# Patient Record
Sex: FEMALE | Race: WHITE | NOT HISPANIC OR LATINO | Employment: STUDENT | URBAN - NONMETROPOLITAN AREA
[De-identification: names, ages, dates, MRNs, and addresses within clinical notes are randomized per-mention and may not be internally consistent; named-entity substitution may affect disease eponyms.]

---
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
## 2019-02-04 NOTE — ED ADULT NURSE NOTE - CARDIO WDL
CONST: No fever, chills or bodyaches  EYES: No pain, redness, drainage or visual changes.  ENT: No ear pain or discharge, nasal discharge or congestion. No sore throat  CARD: No chest pain, palpitations  RESP: No SOB, cough, hemoptysis. No hx of asthma or COPD  GI: No abdominal pain, N/V/D  SKIN: No rashes  NEURO: No headache, dizziness, paresthesias or LOC
Normal rate, regular rhythm, normal S1, S2 heart sounds heard.

## 2019-12-31 NOTE — ED POST DISCHARGE NOTE - RESULT SUMMARY
Rn spoke with her and she is aware normal pap but +HPV however HPV is not high risk type.  She is aware she needs pap in one year.  Copy of her pap and patient education on HPV mailed to her today.   received call from Leonarda, medical coordinator at the [facility], requesting copy of results from pt's visit. faxed them to her with HIPAA precautions

## 2020-05-19 ENCOUNTER — OFFICE VISIT (OUTPATIENT)
Dept: OBSTETRICS AND GYNECOLOGY | Facility: CLINIC | Age: 20
End: 2020-05-19

## 2020-05-19 VITALS
WEIGHT: 187.8 LBS | BODY MASS INDEX: 29.47 KG/M2 | SYSTOLIC BLOOD PRESSURE: 118 MMHG | HEIGHT: 67 IN | DIASTOLIC BLOOD PRESSURE: 80 MMHG

## 2020-05-19 DIAGNOSIS — Z30.431 IUD CHECK UP: ICD-10-CM

## 2020-05-19 DIAGNOSIS — Z00.00 ANNUAL PHYSICAL EXAM: Primary | ICD-10-CM

## 2020-05-19 DIAGNOSIS — T83.32XA MALPOSITIONED IUD, INITIAL ENCOUNTER: ICD-10-CM

## 2020-05-19 PROCEDURE — 99395 PREV VISIT EST AGE 18-39: CPT | Performed by: PHYSICIAN ASSISTANT

## 2020-05-19 PROCEDURE — 58301 REMOVE INTRAUTERINE DEVICE: CPT | Performed by: PHYSICIAN ASSISTANT

## 2020-05-19 NOTE — PROGRESS NOTES
IUD Removal    Date of procedure:  5/19/2020    Risks and benefits discussed? yes  All questions answered? yes  Consents given by The patient  Written consent obtained? yes  Reason for removal: malpositioned      Procedure documentation:    A speculum was placed in order to view the cervix.  A tenaculum did not need to be placed on the anterior cervical lip.  Cervical dilation did not need to be performed in order to access the string.  The IUD string was easily seen.  The string was grasped and the IUD was removed without difficulty.  The IUD did not appear to be adherent to the uterine cavity. It was removed intact.    She tolerated the procedure without any difficulty.     Post procedure instructions: Patient notified to call with heavy bleeding, fever or increasing pain.    Follow up  prn    This note was electronically signed.    Jasmyne Ricardo PA-C  May 19, 2020

## 2020-05-19 NOTE — PATIENT INSTRUCTIONS
Patient is counseled regarding the malpositioned IUD and not protecting her for pregnancy.  After consent is obtained the IUD was removed in the office today without difficulty.  She would like to have a new IUD inserted and she may call with her next menses to schedule insertion of the new IUD.  She is instructed to abstain or use condoms in the meantime.  She is encouraged to use condoms always for prevention of STIs.  Encourage regular exercise.

## 2020-05-19 NOTE — PROGRESS NOTES
Subjective   Chief Complaint   Patient presents with   • Annual Exam     Patient would like to have IUD checked, No complaints       Angela Leung is a 19 y.o. year old  presenting to be seen for her annual exam.  She has Kyleena IUD placed 2017. She has had regular monthly light bleeds but 4 weeks ago had heavy bleed for 1 1/2 weeks. Had normal period -  No pelvic pain or cramping  She is sexually active and monogamous. Declines STI screening today.   After pelvic exam is done today and it is noted that IUD strings appear rather long transvaginal ultrasound is done.  It revealed that the IUD was down in the lower uterine segment and the cervix and not in the endometrium.IUD Removal      History reviewed. No pertinent past medical history.     Current Outpatient Medications:   •  levonorgestrel (Kyleena) 19.5 MG intrauterine device IUD, Take to office as directed, Disp: 1 each, Rfl: 0   No Known Allergies   Past Surgical History:   Procedure Laterality Date   • AVULSION TOENAIL PLATE        Social History     Socioeconomic History   • Marital status: Single     Spouse name: Not on file   • Number of children: Not on file   • Years of education: Not on file   • Highest education level: Not on file   Tobacco Use   • Smoking status: Never Smoker   • Smokeless tobacco: Never Used   Substance and Sexual Activity   • Alcohol use: No   • Drug use: No   • Sexual activity: Yes     Partners: Male     Birth control/protection: IUD      Family History   Problem Relation Age of Onset   • No Known Problems Father    • No Known Problems Mother    • No Known Problems Paternal Grandfather    • No Known Problems Paternal Grandmother    • No Known Problems Maternal Grandmother    • No Known Problems Maternal Grandfather        Review of Systems   Constitutional: Negative for chills, fatigue and fever.   Gastrointestinal: Negative for abdominal pain, constipation, diarrhea, nausea and vomiting.   Genitourinary:  "Positive for vaginal bleeding. Negative for difficulty urinating, dysuria, menstrual problem, pelvic pain and vaginal discharge.   Musculoskeletal: Negative.    All other systems reviewed and are negative.          Objective   /80   Ht 170.2 cm (67\")   Wt 85.2 kg (187 lb 12.8 oz)   Breastfeeding No   BMI 29.41 kg/m²     Physical Exam   Constitutional: She appears well-developed and well-nourished. She is cooperative. No distress.   Eyes: Conjunctivae, EOM and lids are normal.   Neck: No thyroid mass and no thyromegaly present.   Cardiovascular: Normal rate, regular rhythm and normal heart sounds.   Pulmonary/Chest: Effort normal and breath sounds normal.   Abdominal: Soft. Normal appearance. There is no tenderness. There is no rigidity and no guarding.   Genitourinary: Uterus normal. There is no tenderness or lesion on the right labia. There is no tenderness or lesion on the left labia. Cervix exhibits no motion tenderness, no discharge and no friability. Right adnexum displays no mass and no tenderness. Left adnexum displays no mass and no tenderness. No erythema or tenderness in the vagina. No vaginal discharge found.   Genitourinary Comments: IUD strings long 4-5 cm   Musculoskeletal: Normal range of motion.   Neurological: She is alert.   Skin: Skin is warm and dry. No lesion and no rash noted.   Psychiatric: She has a normal mood and affect. Her behavior is normal. Thought content normal.            Assessment and Plan  Angela was seen today for annual exam.    Diagnoses and all orders for this visit:    Annual physical exam    IUD check up  -     Cancel: US Non-ob Transvaginal    Malpositioned IUD, initial encounter    Other orders  -     levonorgestrel (Kyleena) 19.5 MG intrauterine device IUD; Take to office as directed      Patient Instructions   Patient is counseled regarding the malpositioned IUD and not protecting her for pregnancy.  After consent is obtained the IUD was removed in the office " today without difficulty.  She would like to have a new IUD inserted and she may call with her next menses to schedule insertion of the new IUD.  She is instructed to abstain or use condoms in the meantime.  She is encouraged to use condoms always for prevention of STIs.  Encourage regular exercise.             This note was electronically signed.    Jasmyne Ricardo PA-C   May 19, 2020

## 2020-06-08 ENCOUNTER — OFFICE VISIT (OUTPATIENT)
Dept: OBSTETRICS AND GYNECOLOGY | Facility: CLINIC | Age: 20
End: 2020-06-08

## 2020-06-08 VITALS
SYSTOLIC BLOOD PRESSURE: 118 MMHG | BODY MASS INDEX: 29.32 KG/M2 | HEIGHT: 67 IN | WEIGHT: 186.8 LBS | DIASTOLIC BLOOD PRESSURE: 72 MMHG

## 2020-06-08 DIAGNOSIS — Z30.430 ENCOUNTER FOR INSERTION OF INTRAUTERINE CONTRACEPTIVE DEVICE (IUD): Primary | ICD-10-CM

## 2020-06-08 PROCEDURE — 81025 URINE PREGNANCY TEST: CPT | Performed by: PHYSICIAN ASSISTANT

## 2020-06-08 PROCEDURE — 58300 INSERT INTRAUTERINE DEVICE: CPT | Performed by: PHYSICIAN ASSISTANT

## 2020-06-08 NOTE — PROGRESS NOTES
IUD Insertion    Patient's last menstrual period was 06/05/2020.    Date of procedure:  6/8/2020    Risks and benefits discussed? yes  All questions answered? yes  Consents given by The patient  Written consent obtained? yes    Local anesthesia used:  no    Procedure documentation:    After verifying the patient had a low probability of being pregnant and met the criteria for insertion, a sterile speculum has placed and the cervix was cleansed with an antiseptic solution.  Vaginal discharge was scant.  The anterior lip of the cervix was grasped with a tenaculum and the uterine cavity was gently sounded. There was no difficulty passing the sound through the cervix.  Cervical dilation did need to be performed prior to placing the IUD.  The uterus was anteverted and sounded to 7 cms.  The Kyleena IUD was then prepared per the manufacturers instructions.    The Kyleena was advanced to a point 2 cms from the fundus and then the arms were released from the sheath.  The device was advanced to the fundus and the device was released fully from the sheath.. The string was cut 3 cms in length.  Bleeding from the cervix was scant.    She tolerated the procedure without any difficulty.     Post procedure instructions:  Follow up in 5 weeks for IUD check. Call office if any heavy bleeding, significant pain or cramping, fever or chills. No intercourse x 7 days.   It was reviewed that the Kyleena will not alter the timing of when she bleeds but it may decrease the quantity of flow and cramps.  Roughly 30% of people will be amenorrheic over time.  Efficacy rate of 99.2% over 5 years was discussed.     This note was electronically signed.    Jasmyne Ricardo PA-C  June 8, 2020

## 2020-08-06 ENCOUNTER — TELEPHONE (OUTPATIENT)
Dept: OBSTETRICS AND GYNECOLOGY | Facility: CLINIC | Age: 20
End: 2020-08-06

## 2020-08-06 NOTE — TELEPHONE ENCOUNTER
Called and spoke with patient's mother, she advised patient is in [place] for school and had to be seen for heavy bleeding, she advised Physician she seen took out her IUD. She wanted to see if we can send prescription for oral contraceptives?

## 2020-08-06 NOTE — TELEPHONE ENCOUNTER
----- Message from Shahana Desouza sent at 8/6/2020  1:45 PM EDT -----  Contact: MOTHER JEFFREY [phone]  THIS IS NILDA/DR SANTILLAN'S PT.  HER MOTHER WOULD LIKE A CALL BACK -[phone].  SHE DIDN'T SAY WHAT IT WAS REGARDING.  THANKS

## 2020-08-07 RX ORDER — NORETHINDRONE ACETATE AND ETHINYL ESTRADIOL AND FERROUS FUMARATE 1MG-20(24)
1 KIT ORAL DAILY
Qty: 84 TABLET | Refills: 3 | Status: SHIPPED | OUTPATIENT
Start: 2020-08-07 | End: 2020-09-03 | Stop reason: SDUPTHER

## 2020-08-07 NOTE — TELEPHONE ENCOUNTER
Spoke with mother and informed, she advised wanted prescription sent to [facility], she also wanted to see if you can send chewable pill? She advised daughter has difficulty with swallowing them and she had forgot to advise this.

## 2020-09-03 ENCOUNTER — TELEPHONE (OUTPATIENT)
Dept: OBSTETRICS AND GYNECOLOGY | Facility: CLINIC | Age: 20
End: 2020-09-03

## 2020-09-03 RX ORDER — NORETHINDRONE ACETATE AND ETHINYL ESTRADIOL AND FERROUS FUMARATE 1MG-20(24)
1 KIT ORAL DAILY
Qty: 84 TABLET | Refills: 3 | Status: SHIPPED | OUTPATIENT
Start: 2020-09-03

## 2020-09-03 NOTE — TELEPHONE ENCOUNTER
----- Message from Shahana Desouza sent at 9/3/2020 11:41 AM EDT -----  Contact: MOTHER JEFFREY  THIS IS NILDA'S PT.  HER MOTHER CALLED ASKING IF WE CAN SEND 90 DAY REFILLS OF MINASTRIN 24 FE TO [facility].  IT WAS SENT TO [facility] BY ACCIDENT.  THANKS

## 2021-05-03 ENCOUNTER — NON-APPOINTMENT (OUTPATIENT)
Age: 21
End: 2021-05-03

## 2021-05-04 ENCOUNTER — APPOINTMENT (OUTPATIENT)
Dept: CARDIOLOGY | Facility: CLINIC | Age: 21
End: 2021-05-04
Payer: COMMERCIAL

## 2021-05-04 ENCOUNTER — NON-APPOINTMENT (OUTPATIENT)
Age: 21
End: 2021-05-04

## 2021-05-04 VITALS
HEART RATE: 61 BPM | SYSTOLIC BLOOD PRESSURE: 111 MMHG | WEIGHT: 162 LBS | OXYGEN SATURATION: 100 % | DIASTOLIC BLOOD PRESSURE: 73 MMHG | TEMPERATURE: 97.6 F

## 2021-05-04 DIAGNOSIS — U07.1 COVID-19: ICD-10-CM

## 2021-05-04 PROCEDURE — 93000 ELECTROCARDIOGRAM COMPLETE: CPT

## 2021-05-04 PROCEDURE — 99202 OFFICE O/P NEW SF 15 MIN: CPT

## 2021-05-04 PROCEDURE — 99072 ADDL SUPL MATRL&STAF TM PHE: CPT

## 2021-05-04 NOTE — DISCUSSION/SUMMARY
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  She is asymptomatic.  Her exam shows regular rhythm, normal blood pressure, clear lungs, and a normal cardiac exam.  Her EKG is within normal limits.\par \par She has no evidence of any cardiac involvement from her coronavirus infection.  She is cleared for full duty and all activities.

## 2021-05-19 ENCOUNTER — RESULT REVIEW (OUTPATIENT)
Age: 21
End: 2021-05-19

## 2022-07-07 NOTE — HISTORY OF PRESENT ILLNESS
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  She is in excellent general health with no history of any significant medical problems.  She tested positive in early March with and was mildly symptomatic with cold-like symptoms for few days.  She is currently asymptomatic and back on full activities. Headache    A headache is pain or discomfort felt around the head or neck area. The specific cause of a headache may not be found as there are many types including tension headaches, migraine headaches, and cluster headaches. Watch your condition for any changes. Things you can do to manage your pain include taking over the counter and prescription medications as instructed by your health care provider, lying down in a dark quiet room, limiting stress, getting regular sleep, and refraining from alcohol and tobacco products.    SEEK IMMEDIATE MEDICAL CARE IF YOU HAVE ANY OF THE FOLLOWING SYMPTOMS: fever, vomiting, stiff neck, loss of vision, problems with speech, muscle weakness, loss of balance, trouble walking, passing out, or confusion.

## 2023-09-18 NOTE — ED PROVIDER NOTE - NS ED MD DISPO DISCHARGE
Medication: Hydrochlorothiazide   Last office visit date: 9/15/23  Next appointment scheduled?: Yes   Number of refills given: 3    
Home

## 2025-03-11 NOTE — ED PROVIDER NOTE - NS HIV RISK FACTOR YES
Encouraged diet rich with nutrients and protein  Encouraged weight bearing exercises   Unable to consent due to medical condition